# Patient Record
Sex: FEMALE | Race: BLACK OR AFRICAN AMERICAN | NOT HISPANIC OR LATINO | Employment: UNEMPLOYED | URBAN - METROPOLITAN AREA
[De-identification: names, ages, dates, MRNs, and addresses within clinical notes are randomized per-mention and may not be internally consistent; named-entity substitution may affect disease eponyms.]

---

## 2023-02-02 ENCOUNTER — HOSPITAL ENCOUNTER (OUTPATIENT)
Dept: TELEMEDICINE | Facility: HOSPITAL | Age: 58
Discharge: HOME | End: 2023-02-02

## 2023-02-02 NOTE — SIGNIFICANT EVENT
Tele stroke attending on call    Multiple attempts to connect with the spoke facility but unsuccessfully due to malfunctioning card.  Spoke with Dr Batista, ED attending, regarding this pt who was LKW at 10 pm last night and at some point today was noted to have question of L face and L leg weakness as well as slurred speech that according to staff is currently very minimal to non existent.  CT head without acute abnormality.  Although cannot entirely exclude a small acute R subcortical infarct, she is not a proper candidate for treatment with TNK due to late presentation and mild non disabling symptoms .LVO seems unlikely.  She is allergic to contrast, thus recommended MRI/MRA brain to better elucidate her symptoms.        .Feliz Gama MD   Vascular Neurology  [facility]  [facility]